# Patient Record
Sex: MALE | Race: WHITE
[De-identification: names, ages, dates, MRNs, and addresses within clinical notes are randomized per-mention and may not be internally consistent; named-entity substitution may affect disease eponyms.]

---

## 2021-04-13 DIAGNOSIS — Z23 ENCOUNTER FOR IMMUNIZATION: ICD-10-CM

## 2025-01-27 ENCOUNTER — HOSPITAL ENCOUNTER (EMERGENCY)
Dept: HOSPITAL 99 - EMR | Age: 55
Discharge: HOME | End: 2025-01-27
Payer: COMMERCIAL

## 2025-01-27 VITALS — RESPIRATION RATE: 17 BRPM

## 2025-01-27 VITALS — RESPIRATION RATE: 9 BRPM

## 2025-01-27 VITALS — RESPIRATION RATE: 12 BRPM

## 2025-01-27 VITALS — RESPIRATION RATE: 11 BRPM

## 2025-01-27 VITALS — DIASTOLIC BLOOD PRESSURE: 90 MMHG | SYSTOLIC BLOOD PRESSURE: 131 MMHG | RESPIRATION RATE: 12 BRPM

## 2025-01-27 VITALS — BODY MASS INDEX: 29.8 KG/M2

## 2025-01-27 VITALS — SYSTOLIC BLOOD PRESSURE: 143 MMHG | RESPIRATION RATE: 16 BRPM | DIASTOLIC BLOOD PRESSURE: 80 MMHG

## 2025-01-27 VITALS — RESPIRATION RATE: 16 BRPM | SYSTOLIC BLOOD PRESSURE: 131 MMHG | DIASTOLIC BLOOD PRESSURE: 90 MMHG

## 2025-01-27 VITALS — DIASTOLIC BLOOD PRESSURE: 73 MMHG | SYSTOLIC BLOOD PRESSURE: 129 MMHG | RESPIRATION RATE: 9 BRPM

## 2025-01-27 VITALS — SYSTOLIC BLOOD PRESSURE: 132 MMHG | HEART RATE: 68 BPM | DIASTOLIC BLOOD PRESSURE: 80 MMHG

## 2025-01-27 VITALS — DIASTOLIC BLOOD PRESSURE: 70 MMHG | SYSTOLIC BLOOD PRESSURE: 122 MMHG | RESPIRATION RATE: 12 BRPM

## 2025-01-27 VITALS — RESPIRATION RATE: 15 BRPM

## 2025-01-27 VITALS — SYSTOLIC BLOOD PRESSURE: 105 MMHG | RESPIRATION RATE: 18 BRPM | DIASTOLIC BLOOD PRESSURE: 72 MMHG

## 2025-01-27 VITALS — RESPIRATION RATE: 13 BRPM

## 2025-01-27 VITALS — SYSTOLIC BLOOD PRESSURE: 132 MMHG | DIASTOLIC BLOOD PRESSURE: 69 MMHG

## 2025-01-27 VITALS — DIASTOLIC BLOOD PRESSURE: 74 MMHG | RESPIRATION RATE: 18 BRPM | SYSTOLIC BLOOD PRESSURE: 141 MMHG

## 2025-01-27 VITALS — RESPIRATION RATE: 16 BRPM

## 2025-01-27 VITALS — RESPIRATION RATE: 22 BRPM

## 2025-01-27 DIAGNOSIS — J10.1: Primary | ICD-10-CM

## 2025-01-27 DIAGNOSIS — D86.9: ICD-10-CM

## 2025-01-27 DIAGNOSIS — F17.200: ICD-10-CM

## 2025-01-27 DIAGNOSIS — R55: ICD-10-CM

## 2025-01-27 DIAGNOSIS — I10: ICD-10-CM

## 2025-01-27 LAB
ALBUMIN SERPL-MCNC: 3.7 G/DL (ref 3.5–5)
ALP SERPL-CCNC: 58 U/L (ref 38–126)
ALT SERPL-CCNC: 28 U/L (ref 0–50)
AST SERPL-CCNC: 29 U/L (ref 17–59)
BUN SERPL-MCNC: 21 MG/DL (ref 9–20)
CALCIUM SERPL-MCNC: 8.8 MG/DL (ref 8.4–10.2)
CHLORIDE SERPL-SCNC: 102 MMOL/L (ref 98–107)
CO2 SERPL-SCNC: 26 MMOL/L (ref 22–30)
EGFR: > 60
ERYTHROCYTE [DISTWIDTH] IN BLOOD BY AUTOMATED COUNT: 12.4 % (ref 11.5–14.5)
ESTIMATED CREATININE CLEARANCE: 93 ML/MIN
GLUCOSE SERPL-MCNC: 106 MG/DL (ref 70–99)
HCT VFR BLD AUTO: 40 % (ref 39–52)
HGB BLD-MCNC: 13.8 G/DL (ref 13–18)
MCHC RBC AUTO-ENTMCNC: 34.5 G/DL (ref 33–37)
MCV RBC AUTO: 92.2 FL (ref 80–94)
NRBC BLD AUTO-RTO: 0 %
PLATELET # BLD AUTO: 132 10^3/UL (ref 130–400)
POTASSIUM SERPL-SCNC: 3.9 MMOL/L (ref 3.5–5.1)
PROT SERPL-MCNC: 6.1 G/DL (ref 6.3–8.2)
SODIUM SERPL-SCNC: 137 MMOL/L (ref 135–145)

## 2025-01-27 PROCEDURE — 96360 HYDRATION IV INFUSION INIT: CPT

## 2025-01-27 PROCEDURE — 99284 EMERGENCY DEPT VISIT MOD MDM: CPT

## 2025-01-27 RX ADMIN — SODIUM CHLORIDE 1000: 900 INJECTION, SOLUTION INTRAVENOUS at 05:22

## 2025-01-27 NOTE — EDRN
Patient ambulated into the restroom and back in bed, was able to ambulate without difficulty, got patient a cup of water as well